# Patient Record
Sex: MALE | URBAN - NONMETROPOLITAN AREA
[De-identification: names, ages, dates, MRNs, and addresses within clinical notes are randomized per-mention and may not be internally consistent; named-entity substitution may affect disease eponyms.]

---

## 2019-06-25 ENCOUNTER — APPOINTMENT (RX ONLY)
Dept: URBAN - NONMETROPOLITAN AREA CLINIC 22 | Facility: CLINIC | Age: 59
Setting detail: DERMATOLOGY
End: 2019-06-25

## 2019-06-25 VITALS — HEIGHT: 72 IN | WEIGHT: 250 LBS

## 2019-06-25 PROBLEM — D23.122 OTHER BENIGN NEOPLASM OF SKIN OF LEFT LOWER EYELID, INCLUDING CANTHUS: Status: ACTIVE | Noted: 2019-06-25

## 2019-06-25 PROCEDURE — ? TREATMENT REGIMEN

## 2019-06-25 PROCEDURE — ? COUNSELING

## 2019-06-25 PROCEDURE — 99201: CPT

## 2019-06-25 NOTE — PROCEDURE: TREATMENT REGIMEN
Detail Level: Zone
Plan: Referred to 
Plan: Simple drainage with 30g needle and gentle compression performed today with clear fluid easily drained.  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 Archibald for further tx/excision.

## 2023-08-29 ENCOUNTER — APPOINTMENT (RX ONLY)
Dept: URBAN - NONMETROPOLITAN AREA CLINIC 22 | Facility: CLINIC | Age: 63
Setting detail: DERMATOLOGY
End: 2023-08-29

## 2023-08-29 PROBLEM — D23.122 OTHER BENIGN NEOPLASM OF SKIN OF LEFT LOWER EYELID, INCLUDING CANTHUS: Status: ACTIVE | Noted: 2023-08-29

## 2023-08-29 PROCEDURE — ? INCISION AND DRAINAGE

## 2023-08-29 PROCEDURE — ? DEFER

## 2023-08-29 PROCEDURE — 67700 BLEPHAROTOMY DRG ABSC EYELID: CPT

## 2023-08-29 PROCEDURE — ? COUNSELING

## 2023-08-29 NOTE — PROCEDURE: INCISION AND DRAINAGE
Detail Level: Detailed
Lesion Type: Do Not Override Chosen Diagnosis
Size Of Lesion In Cm (Optional): 0
Complications: None
I And D: A chalazion clamp was placed on the eyelid, and the lid was everted.   A #15 Bard Eric blade was used to make a cruciate incision in the tarsal surface, followed by curettage with a chalazion curette.  A sharp Mariajose scissors and 0.12 forceps were used to excise the wall of the chalazion, with hot cautery for hemostasis.
Consent: After reading and signing the consent form, the patient was identified, and placed on a stretcher in the procedure room.
Post-Care Instructions: The clamp was removed and a semi-pressure patch was placed over the eye and the patient was instructed to use warm compresses QID for the next week, and return in several weeks for follow up exam.

## 2023-08-29 NOTE — PROCEDURE: DEFER
Reason To Defer Override: Refer to occular surgeon
Introduction Text (Please End With A Colon): The following procedure was deferred:
Protopic Pregnancy And Lactation Text: This medication is Pregnancy Category C. It is unknown if this medication is excreted in breast milk when applied topically.
Size Of Lesion In Cm (Optional): 0
Detail Level: Zone

## 2023-08-29 NOTE — PROCEDURE: MIPS QUALITY
Additional Notes: The E/M service provided during this visit was medically necessary, significant, and independent from the procedure(s) provided on the same date of service and included documented elements above and beyond the usual pre-, intra-, and post-operative work associated with the procedure(s).
Quality 226: Preventive Care And Screening: Tobacco Use: Screening And Cessation Intervention: Patient screened for tobacco use and is an ex/non-smoker
Quality 130: Documentation Of Current Medications In The Medical Record: Current Medications Documented
Quality 431: Preventive Care And Screening: Unhealthy Alcohol Use - Screening: Patient not identified as an unhealthy alcohol user when screened for unhealthy alcohol use using a systematic screening method
Detail Level: Detailed